# Patient Record
Sex: FEMALE | Race: BLACK OR AFRICAN AMERICAN | Employment: FULL TIME | URBAN - METROPOLITAN AREA
[De-identification: names, ages, dates, MRNs, and addresses within clinical notes are randomized per-mention and may not be internally consistent; named-entity substitution may affect disease eponyms.]

---

## 2024-11-05 PROBLEM — R87.610 ASCUS WITH POSITIVE HIGH RISK HPV CERVICAL: Status: ACTIVE | Noted: 2021-12-15

## 2024-11-05 PROBLEM — J45.909 ASTHMA: Status: RESOLVED | Noted: 2023-02-22 | Resolved: 2024-11-05

## 2024-11-05 PROBLEM — I10 HYPERTENSION: Status: RESOLVED | Noted: 2023-02-22 | Resolved: 2024-11-05

## 2024-11-05 PROBLEM — R87.810 ASCUS WITH POSITIVE HIGH RISK HPV CERVICAL: Status: ACTIVE | Noted: 2021-12-15

## 2024-11-05 PROBLEM — J45.20 MILD INTERMITTENT ASTHMA WITHOUT COMPLICATION: Status: ACTIVE | Noted: 2018-05-03

## 2024-11-05 PROBLEM — Z90.710 HX OF TOTAL HYSTERECTOMY: Status: ACTIVE | Noted: 2024-05-13

## 2024-11-05 PROBLEM — R20.0 LEFT SIDED NUMBNESS: Status: RESOLVED | Noted: 2023-02-17 | Resolved: 2024-11-05

## 2024-11-22 ENCOUNTER — HOSPITAL ENCOUNTER (OUTPATIENT)
Dept: WOMENS IMAGING | Age: 54
Discharge: HOME | End: 2024-11-22
Attending: INTERNAL MEDICINE

## 2024-11-22 DIAGNOSIS — R92.8 ABNORMAL MAMMOGRAM: ICD-10-CM

## 2025-01-21 ENCOUNTER — OFFICE VISIT (OUTPATIENT)
Dept: SLEEP MEDICINE | Age: 55
End: 2025-01-21
Payer: COMMERCIAL

## 2025-01-21 VITALS
SYSTOLIC BLOOD PRESSURE: 125 MMHG | HEIGHT: 67 IN | OXYGEN SATURATION: 98 % | TEMPERATURE: 96.9 F | DIASTOLIC BLOOD PRESSURE: 70 MMHG | BODY MASS INDEX: 45.17 KG/M2 | HEART RATE: 95 BPM | RESPIRATION RATE: 18 BRPM | WEIGHT: 287.8 LBS

## 2025-01-21 DIAGNOSIS — R06.89 GASPING FOR BREATH: ICD-10-CM

## 2025-01-21 DIAGNOSIS — I10 BENIGN ESSENTIAL HTN: ICD-10-CM

## 2025-01-21 DIAGNOSIS — G47.33 OSA (OBSTRUCTIVE SLEEP APNEA): Primary | ICD-10-CM

## 2025-01-21 DIAGNOSIS — R06.83 SNORING: ICD-10-CM

## 2025-01-21 DIAGNOSIS — G47.19 EXCESSIVE DAYTIME SLEEPINESS: ICD-10-CM

## 2025-01-21 PROCEDURE — 3074F SYST BP LT 130 MM HG: CPT | Performed by: PSYCHIATRY & NEUROLOGY

## 2025-01-21 PROCEDURE — 99204 OFFICE O/P NEW MOD 45 MIN: CPT | Performed by: PSYCHIATRY & NEUROLOGY

## 2025-01-21 PROCEDURE — 3078F DIAST BP <80 MM HG: CPT | Performed by: PSYCHIATRY & NEUROLOGY

## 2025-01-21 ASSESSMENT — ENCOUNTER SYMPTOMS
RESPIRATORY NEGATIVE: 1
GASTROINTESTINAL NEGATIVE: 1
EYES NEGATIVE: 1
ALLERGIC/IMMUNOLOGIC NEGATIVE: 1
SORE THROAT: 1

## 2025-01-21 ASSESSMENT — SLEEP AND FATIGUE QUESTIONNAIRES
HOW LIKELY ARE YOU TO NOD OFF OR FALL ASLEEP IN A CAR, WHILE STOPPED FOR A FEW MINUTES IN TRAFFIC: WOULD NEVER DOZE
HOW LIKELY ARE YOU TO NOD OFF OR FALL ASLEEP WHILE SITTING AND TALKING TO SOMEONE: WOULD NEVER DOZE
HOW LIKELY ARE YOU TO NOD OFF OR FALL ASLEEP WHILE SITTING QUIETLY AFTER LUNCH WITHOUT ALCOHOL: SLIGHT CHANCE OF DOZING
ESS TOTAL SCORE: 6
HOW LIKELY ARE YOU TO NOD OFF OR FALL ASLEEP WHEN YOU ARE A PASSENGER IN A CAR FOR AN HOUR WITHOUT A BREAK: SLIGHT CHANCE OF DOZING
HOW LIKELY ARE YOU TO NOD OFF OR FALL ASLEEP WHILE SITTING INACTIVE IN A PUBLIC PLACE: WOULD NEVER DOZE
HOW LIKELY ARE YOU TO NOD OFF OR FALL ASLEEP WHILE LYING DOWN TO REST IN THE AFTERNOON WHEN CIRCUMSTANCES PERMIT: MODERATE CHANCE OF DOZING
HOW LIKELY ARE YOU TO NOD OFF OR FALL ASLEEP WHILE WATCHING TV: SLIGHT CHANCE OF DOZING
HOW LIKELY ARE YOU TO NOD OFF OR FALL ASLEEP WHILE SITTING AND READING: SLIGHT CHANCE OF DOZING

## 2025-01-21 NOTE — PATIENT INSTRUCTIONS
Orders Placed This Encounter   Procedures    Home Sleep Study     Standing Status:   Future     Standing Expiration Date:   1/21/2026     Order Specific Question:   Location For Sleep Study     Answer:   [place]     Order Specific Question:   Select Sleep Lab Location     Answer:   [facility]

## 2025-01-21 NOTE — PROGRESS NOTES
MD JENNIFER Chacon Board Certified in Sleep Medicine  [facility]ified [facility]  Board Certified in [facility]  [address]   [address]  [place]  [address]  P- [phone]   [facility]   [address]  [address]   [place], [place] [address]           [facility]  [address] [address] [phone]    Subjective:     Patient ID: Milli Ruiz is a 54 y.o. female.    Chief Complaint   Patient presents with    [facility] Care    Snoring       HPI:        Milli Ruiz is a 54 y.o. female referred by Dr. Martinez for a sleep evaluation. She complains of snoring, snorting, choking, tossing and turning, excessive daytime sleepiness, feels sleepy during the day, take naps during the day but she denies periods of not breathing, knees buckling with laughing, completely or partially paralyzed while falling asleep or waking up.  Symptoms began several years ago, gradually worsening since that time.   The patient's bed-partner confirmed the snoring without stopped breathing at night  SLEEP SCHEDULE: Goes to bed around 11:30 PM in the weekdays and 11:30 PM in the weekends. It usually takes the patient 10 minutes to fall asleep. The patient gets up 1 per night to go to the bathroom. The Patient finally gets up at 7 AM during the weekdays and 7 AM in the weekends. patient wakes up with dry mouth.  The patient has restless sleep with frequent arousals in addition to the Patient has significant daytime sleepiness. The Patient scored Erin Sleepiness Score: 6 on Erin Sleepiness Scale ( more than 10 is indicative of daytime sleepiness)and 34 in fatigue scale ( more than 36 is indicative of daytime fatigue). The patient takes 1-2 naps/week for 120 minutes and usually is not refreshing nap.     Previous evaluation and treatment has included-

## 2025-01-28 PROBLEM — D58.2 HIGH BLOOD HEMOGLOBIN F: Status: ACTIVE | Noted: 2025-01-28

## 2025-01-28 PROBLEM — R73.03 PREDIABETES: Status: ACTIVE | Noted: 2025-01-28

## 2025-01-28 PROBLEM — R70.0 ELEVATED SED RATE: Status: ACTIVE | Noted: 2025-01-28

## 2025-01-28 PROBLEM — D63.8 ANEMIA OF CHRONIC DISEASE: Status: ACTIVE | Noted: 2025-01-28

## 2025-02-10 PROBLEM — N18.31 STAGE 3A CHRONIC KIDNEY DISEASE (HCC): Status: ACTIVE | Noted: 2025-02-10

## 2025-04-01 NOTE — PROGRESS NOTES
Milli Ruiz (:  1970) is a 54 y.o. female,, here for evaluation of the following chief complaint(s):  New Patient (Bp concerns feeling light headed)    PMHx: osteoarthritis, mild intermittent asthma, morbid obesity, HLD, HTN, PTSD, chronic allergic rhinitis, hx of abnormal pap smear, elevated Hg F, anemia of chronic dx,CHARLES, CKD, prediabetes,    Microcytic anemaia- Elevated HgF in labs done on 2025 by her prior PCP , was advised to follow up with hem onc.  Started to see hematologist at [facility].    Mammogram 3/28/2025- 9 millimeter mass identified in the 5-6:00 region of the patient's left breast       Works at [employer].    Paternal grandmother with breast cancer, paternal uncle and aunt caner- unknown   Stroke - paternal grandfather    - lives with  and has step kids.  Assessment & Plan  Benign essential HTN    Chronic  Her blood pressure has been in the lower side, recent result-101/58 and patient is symptomatic.  Reduce the dose of her blood pressure medication  Continue with verapamil 180 daily, stop hydrochlorothiazide.  Continue with Aldactone and lisinopril.  Counseled on low-salt diet.       Mild intermittent asthma without complication   Chronic, at goal (stable), continue current treatment plan         Mixed hyperlipidemia   Chronic, at goal (stable), continue current treatment plan  Tolerating Lipitor 40 mg daily.       Prediabetes   Chronic, at goal (stable), lifestyle modifications recommended         Abnormal mammogram    Mammogram done yesterday showed a 9 mm mass.  Follows her GYN.  Has scheduled for ultrasound and diagnostic mammogram.  Will wait for results.       Stage 3a chronic kidney disease (HCC)   Chronic, at goal (stable), avoid nephrotoxic's agents and, increase hydration and lifestyle modifications recommended         Light-headed    In the setting of hypotension  Patient's medication adjustment made.         Return in about 2 weeks (around 2025)

## 2025-04-02 ENCOUNTER — OFFICE VISIT (OUTPATIENT)
Dept: INTERNAL MEDICINE CLINIC | Age: 55
End: 2025-04-02
Payer: COMMERCIAL

## 2025-04-02 VITALS
BODY MASS INDEX: 45.36 KG/M2 | DIASTOLIC BLOOD PRESSURE: 70 MMHG | SYSTOLIC BLOOD PRESSURE: 122 MMHG | HEART RATE: 92 BPM | WEIGHT: 289 LBS | OXYGEN SATURATION: 96 % | HEIGHT: 67 IN

## 2025-04-02 DIAGNOSIS — R92.8 ABNORMAL MAMMOGRAM: ICD-10-CM

## 2025-04-02 DIAGNOSIS — R73.03 PREDIABETES: ICD-10-CM

## 2025-04-02 DIAGNOSIS — N18.31 STAGE 3A CHRONIC KIDNEY DISEASE (HCC): ICD-10-CM

## 2025-04-02 DIAGNOSIS — J45.20 MILD INTERMITTENT ASTHMA WITHOUT COMPLICATION: ICD-10-CM

## 2025-04-02 DIAGNOSIS — I10 BENIGN ESSENTIAL HTN: Primary | ICD-10-CM

## 2025-04-02 DIAGNOSIS — E78.2 MIXED HYPERLIPIDEMIA: ICD-10-CM

## 2025-04-02 DIAGNOSIS — R42 LIGHT-HEADED: ICD-10-CM

## 2025-04-02 PROCEDURE — 99215 OFFICE O/P EST HI 40 MIN: CPT | Performed by: STUDENT IN AN ORGANIZED HEALTH CARE EDUCATION/TRAINING PROGRAM

## 2025-04-02 PROCEDURE — 3074F SYST BP LT 130 MM HG: CPT | Performed by: STUDENT IN AN ORGANIZED HEALTH CARE EDUCATION/TRAINING PROGRAM

## 2025-04-02 PROCEDURE — G2211 COMPLEX E/M VISIT ADD ON: HCPCS | Performed by: STUDENT IN AN ORGANIZED HEALTH CARE EDUCATION/TRAINING PROGRAM

## 2025-04-02 PROCEDURE — 3078F DIAST BP <80 MM HG: CPT | Performed by: STUDENT IN AN ORGANIZED HEALTH CARE EDUCATION/TRAINING PROGRAM

## 2025-04-02 RX ORDER — VERAPAMIL HYDROCHLORIDE 180 MG/1
180 TABLET, FILM COATED, EXTENDED RELEASE ORAL NIGHTLY
Qty: 180 TABLET | Refills: 0
Start: 2025-04-02

## 2025-04-02 SDOH — HEALTH STABILITY: PHYSICAL HEALTH: ON AVERAGE, HOW MANY DAYS PER WEEK DO YOU ENGAGE IN MODERATE TO STRENUOUS EXERCISE (LIKE A BRISK WALK)?: 5 DAYS

## 2025-04-02 SDOH — HEALTH STABILITY: PHYSICAL HEALTH: ON AVERAGE, HOW MANY MINUTES DO YOU ENGAGE IN EXERCISE AT THIS LEVEL?: 20 MIN

## 2025-04-02 ASSESSMENT — ENCOUNTER SYMPTOMS
SHORTNESS OF BREATH: 0
GASTROINTESTINAL NEGATIVE: 1
EYE DISCHARGE: 0
ALLERGIC/IMMUNOLOGIC NEGATIVE: 1
EYES NEGATIVE: 1
ABDOMINAL PAIN: 0
RESPIRATORY NEGATIVE: 1

## 2025-04-02 NOTE — ASSESSMENT & PLAN NOTE
Chronic, at goal (stable), avoid nephrotoxic's agents and, increase hydration and lifestyle modifications recommended

## 2025-04-02 NOTE — ASSESSMENT & PLAN NOTE
Chronic  Her blood pressure has been in the lower side, recent result-101/58 and patient is symptomatic.  Reduce the dose of her blood pressure medication  Continue with verapamil 180 daily, stop hydrochlorothiazide.  Continue with Aldactone and lisinopril.  Counseled on low-salt diet.

## 2025-04-16 ASSESSMENT — ENCOUNTER SYMPTOMS
EYES NEGATIVE: 1
ABDOMINAL PAIN: 0
EYE DISCHARGE: 0
SHORTNESS OF BREATH: 0
GASTROINTESTINAL NEGATIVE: 1
RESPIRATORY NEGATIVE: 1
ALLERGIC/IMMUNOLOGIC NEGATIVE: 1

## 2025-04-16 NOTE — PROGRESS NOTES
Milli Ruiz (:  1970) is a 54 y.o. female,, here for evaluation of the following chief complaint(s):  Hypertension    PMHx: osteoarthritis, mild intermittent asthma, morbid obesity, HLD, HTN, PTSD, chronic allergic rhinitis, hx of abnormal pap smear, elevated Hg F, anemia of chronic dx,CHARLES, CKD, prediabetes,    Microcytic anemaia- Elevated HgF in labs done on 2025 by her prior PCP , was advised to follow up with hem onc.  Started to see hematologist at [facility].    Mammogram 3/28/2025- 9 millimeter mass identified in the 5-6:00 region of the patient's left breast  Mammogram and usg- 2025- benign findings- f/u in 1 year       Works at [employer].    Paternal grandmother with breast cancer, paternal uncle and aunt caner- unknown   Stroke - paternal grandfather    - lives with  and has step kids.  Assessment & Plan  Benign essential HTN   Chronic, at goal (stable), improvement on her blood pressure.  No longer reports of symptoms, declines dizziness.  Continue with current dose of lisinopril 40 mg, Aldactone 25 mg.  Lowered the dose of verapamil to 120 mg daily.  Encouraged on low-salt diet.  Orders:    verapamil (CALAN SR) 120 MG extended release tablet; Take 1 tablet by mouth nightly    Seasonal allergies    Chronic  Symptoms controlled on medicine.  Continue with Singulair, nasal spray.            Return in about 3 months (around 2025).       Subjective   HPI    Review of Systems   Constitutional: Negative.  Negative for chills and fever.   HENT: Negative.     Eyes: Negative.  Negative for discharge.   Respiratory: Negative.  Negative for shortness of breath.    Cardiovascular: Negative.  Negative for chest pain and palpitations.   Gastrointestinal: Negative.  Negative for abdominal pain.   Endocrine: Negative.    Genitourinary: Negative.    Musculoskeletal: Negative.    Skin: Negative.    Allergic/Immunologic: Negative.    Neurological: Negative.  Negative for dizziness

## 2025-04-17 ENCOUNTER — OFFICE VISIT (OUTPATIENT)
Dept: INTERNAL MEDICINE CLINIC | Age: 55
End: 2025-04-17

## 2025-04-17 VITALS
SYSTOLIC BLOOD PRESSURE: 108 MMHG | HEART RATE: 91 BPM | BODY MASS INDEX: 45.73 KG/M2 | DIASTOLIC BLOOD PRESSURE: 80 MMHG | OXYGEN SATURATION: 99 % | WEIGHT: 292 LBS

## 2025-04-17 DIAGNOSIS — J30.2 SEASONAL ALLERGIES: ICD-10-CM

## 2025-04-17 DIAGNOSIS — I10 BENIGN ESSENTIAL HTN: Primary | ICD-10-CM

## 2025-04-17 RX ORDER — VERAPAMIL HYDROCHLORIDE 120 MG/1
120 TABLET, FILM COATED, EXTENDED RELEASE ORAL NIGHTLY
Qty: 90 TABLET | Refills: 0 | Status: SHIPPED | OUTPATIENT
Start: 2025-04-17

## 2025-04-17 NOTE — ASSESSMENT & PLAN NOTE
Chronic, at goal (stable), improvement on her blood pressure.  No longer reports of symptoms, declines dizziness.  Continue with current dose of lisinopril 40 mg, Aldactone 25 mg.  Lowered the dose of verapamil to 120 mg daily.  Encouraged on low-salt diet.  Orders:    verapamil (CALAN SR) 120 MG extended release tablet; Take 1 tablet by mouth nightly

## 2025-05-28 ENCOUNTER — TELEPHONE (OUTPATIENT)
Dept: INTERNAL MEDICINE CLINIC | Age: 55
End: 2025-05-28

## 2025-05-28 DIAGNOSIS — J30.2 SEASONAL ALLERGIES: Primary | ICD-10-CM

## 2025-05-28 DIAGNOSIS — J45.20 MILD INTERMITTENT ASTHMA WITHOUT COMPLICATION: ICD-10-CM

## 2025-05-28 NOTE — TELEPHONE ENCOUNTER
Pt is requesting a referral for an allergy specialist    She is having issues with dust mites in her office at work. OTC stuff is not working    Please advise    Thank you

## 2025-06-18 ENCOUNTER — OFFICE VISIT (OUTPATIENT)
Dept: INTERNAL MEDICINE CLINIC | Age: 55
End: 2025-06-18
Payer: COMMERCIAL

## 2025-06-18 VITALS
SYSTOLIC BLOOD PRESSURE: 138 MMHG | DIASTOLIC BLOOD PRESSURE: 60 MMHG | BODY MASS INDEX: 46.67 KG/M2 | WEIGHT: 293 LBS | OXYGEN SATURATION: 99 % | HEART RATE: 61 BPM

## 2025-06-18 DIAGNOSIS — N18.31 STAGE 3A CHRONIC KIDNEY DISEASE (HCC): ICD-10-CM

## 2025-06-18 DIAGNOSIS — J01.00 ACUTE NON-RECURRENT MAXILLARY SINUSITIS: Primary | ICD-10-CM

## 2025-06-18 DIAGNOSIS — D58.2 HIGH BLOOD HEMOGLOBIN F: ICD-10-CM

## 2025-06-18 DIAGNOSIS — L29.9 GENERALIZED PRURITUS: ICD-10-CM

## 2025-06-18 PROCEDURE — 99214 OFFICE O/P EST MOD 30 MIN: CPT | Performed by: STUDENT IN AN ORGANIZED HEALTH CARE EDUCATION/TRAINING PROGRAM

## 2025-06-18 PROCEDURE — G2211 COMPLEX E/M VISIT ADD ON: HCPCS | Performed by: STUDENT IN AN ORGANIZED HEALTH CARE EDUCATION/TRAINING PROGRAM

## 2025-06-18 PROCEDURE — 3075F SYST BP GE 130 - 139MM HG: CPT | Performed by: STUDENT IN AN ORGANIZED HEALTH CARE EDUCATION/TRAINING PROGRAM

## 2025-06-18 PROCEDURE — 3078F DIAST BP <80 MM HG: CPT | Performed by: STUDENT IN AN ORGANIZED HEALTH CARE EDUCATION/TRAINING PROGRAM

## 2025-06-18 RX ORDER — AZITHROMYCIN 250 MG/1
TABLET, FILM COATED ORAL
Qty: 6 TABLET | Refills: 0 | Status: SHIPPED | OUTPATIENT
Start: 2025-06-18 | End: 2025-06-28

## 2025-06-18 RX ORDER — HYDROXYZINE HYDROCHLORIDE 25 MG/1
25 TABLET, FILM COATED ORAL EVERY 8 HOURS PRN
Qty: 30 TABLET | Refills: 0 | Status: SHIPPED | OUTPATIENT
Start: 2025-06-18 | End: 2025-06-28

## 2025-06-18 ASSESSMENT — ENCOUNTER SYMPTOMS
GASTROINTESTINAL NEGATIVE: 1
SINUS PRESSURE: 1
EYES NEGATIVE: 1
EYE DISCHARGE: 0
ALLERGIC/IMMUNOLOGIC NEGATIVE: 1
SORE THROAT: 1
SHORTNESS OF BREATH: 0
RESPIRATORY NEGATIVE: 1
ABDOMINAL PAIN: 0
SINUS COMPLAINT: 1

## 2025-06-18 NOTE — PROGRESS NOTES
Milli Ruiz (:  1970) is a 54 y.o. female,, here for evaluation of the following chief complaint(s):  Sinus Problem (Itching and tingle allergy test done 25 nothing found)    PMHx: osteoarthritis, mild intermittent asthma, morbid obesity, HLD, HTN, PTSD, chronic allergic rhinitis, hx of abnormal pap smear, elevated Hg F, anemia of chronic dx,CHARLES, CKD, prediabetes,    Microcytic anemaia- Elevated HgF in labs done on 2025 by her prior PCP , was advised to follow up with hem onc.  Started to see hematologist at [facility].    Mammogram 3/28/2025- 9 millimeter mass identified in the 5-6:00 region of the patient's left breast  Mammogram and usg- 2025- benign findings- f/u in 1 year       Works at [employer].    Paternal grandmother with breast cancer, paternal uncle and aunt caner- unknown   Stroke - paternal grandfather    - lives with  and has step kids.      Was soon by the allergy specialist and test negative.  Assessment & Plan  Acute non-recurrent maxillary sinusitis   Acute condition, new, resolved after Allegra to get her allergy skin test.  Start on azithromycin.  Restart using Allegra.  Continue with his nasal spray.  Orders:    azithromycin (ZITHROMAX) 250 MG tablet; 500mg on day 1 followed by 250mg on days 2 - 5    Generalized pruritus  Worsening,  Was seen by allergist, labs showed normal liver function and kidney function.  Known history of hpf, following with oncologist.  Advised to follow-up with oncologist for further evaluation.  Use Atarax as needed.  Continue using topical Benadryl.  Orders:    hydrOXYzine HCl (ATARAX) 25 MG tablet; Take 1 tablet by mouth every 8 hours as needed for Itching    High blood hemoglobin F   Monitored by specialist- no acute findings meriting change in the plan         Stage 3a chronic kidney disease (HCC)   Chronic  Kidney function has improved.  Continue to avoid nephrotoxic agent.  Increase hydration.            Return if

## 2025-06-18 NOTE — ASSESSMENT & PLAN NOTE
Chronic  Kidney function has improved.  Continue to avoid nephrotoxic agent.  Increase hydration.

## 2025-06-30 ENCOUNTER — OFFICE VISIT (OUTPATIENT)
Dept: INTERNAL MEDICINE CLINIC | Age: 55
End: 2025-06-30
Payer: COMMERCIAL

## 2025-06-30 VITALS
SYSTOLIC BLOOD PRESSURE: 114 MMHG | OXYGEN SATURATION: 95 % | TEMPERATURE: 98 F | DIASTOLIC BLOOD PRESSURE: 80 MMHG | BODY MASS INDEX: 46.86 KG/M2 | WEIGHT: 293 LBS | HEART RATE: 102 BPM

## 2025-06-30 DIAGNOSIS — J01.00 ACUTE NON-RECURRENT MAXILLARY SINUSITIS: Primary | ICD-10-CM

## 2025-06-30 PROCEDURE — 3074F SYST BP LT 130 MM HG: CPT | Performed by: STUDENT IN AN ORGANIZED HEALTH CARE EDUCATION/TRAINING PROGRAM

## 2025-06-30 PROCEDURE — 3079F DIAST BP 80-89 MM HG: CPT | Performed by: STUDENT IN AN ORGANIZED HEALTH CARE EDUCATION/TRAINING PROGRAM

## 2025-06-30 PROCEDURE — 99213 OFFICE O/P EST LOW 20 MIN: CPT | Performed by: STUDENT IN AN ORGANIZED HEALTH CARE EDUCATION/TRAINING PROGRAM

## 2025-06-30 RX ORDER — PREDNISONE 20 MG/1
TABLET ORAL
Qty: 10 TABLET | Refills: 0 | Status: SHIPPED | OUTPATIENT
Start: 2025-06-30 | End: 2025-07-08

## 2025-06-30 RX ORDER — BUDESONIDE 0.5 MG/2ML
500 INHALANT ORAL 2 TIMES DAILY
Qty: 60 EACH | Refills: 3 | Status: SHIPPED | OUTPATIENT
Start: 2025-06-30

## 2025-06-30 NOTE — PROGRESS NOTES
Milli Ruiz (:  1970) is a 54 y.o. female,Established patient, here for evaluation of the following chief complaint(s): Sinusitis (Seen 25; sinus; given abx; now having same sx's)    Milli is a 54 year old female with past medical history as noted below.    Active Ambulatory Problems     Diagnosis Date Noted    Hyperlipidemia 2023    BMI 40.0-44.9, adult (HCC) 2024    Allergic rhinitis 2014    ASCUS with positive high risk HPV cervical 12/15/2021    Benign essential HTN 2006    Hx of total hysterectomy 2024    Mild intermittent asthma without complication 2018    High blood hemoglobin F 2025    Anemia of chronic disease 2025    Prediabetes 2025    Elevated sed rate 2025    Stage 3a chronic kidney disease (HCC) 02/10/2025     Resolved Ambulatory Problems     Diagnosis Date Noted    Left sided numbness 2023    Asthma 2023    Hypertension 2023    Other iron deficiency anemias 2005     Past Medical History:   Diagnosis Date    Arthritis     Neck pain     PTSD (post-traumatic stress disorder)     Short-term memory loss      She was seen in the office on - for acute sinusitis. She was treated with azithromycin and encouraged to continue Allegra and Flonase. She reports symptoms have not improved. Does report some mild relief for maybe four days. She continues to have symptoms of congestion, BL ear pressure, eye watering, and rhinorrhea. Denies fevers, chills, nausea, vomiting, or diarrhea. Does report ABX use in the last 90 days.     She has been taking azelastine, navage, Flonase and allegra. She has been on allegra for about two months.     Vitals:    25 1356   BP: 114/80   Pulse: (!) 102   Temp: 98 °F (36.7 °C)   SpO2: 95%        Assessment & Plan  Acute non-recurrent maxillary sinusitis   Acute condition, new, Educational material distributed and questions answered.  - START Augmentin and prednisone

## 2025-07-08 ASSESSMENT — ENCOUNTER SYMPTOMS
FACIAL SWELLING: 0
TROUBLE SWALLOWING: 0
RHINORRHEA: 1
SINUS PAIN: 1
SORE THROAT: 0
SINUS PRESSURE: 1
NAUSEA: 0
VOMITING: 0
VOICE CHANGE: 0
DIARRHEA: 0

## 2025-07-29 DIAGNOSIS — I10 BENIGN ESSENTIAL HTN: ICD-10-CM

## 2025-07-29 RX ORDER — VERAPAMIL HYDROCHLORIDE 120 MG/1
TABLET, FILM COATED, EXTENDED RELEASE ORAL
Qty: 90 TABLET | Refills: 0 | Status: SHIPPED | OUTPATIENT
Start: 2025-07-29

## 2025-07-29 NOTE — TELEPHONE ENCOUNTER
Future Appointments   Date Time Provider Department Center   8/8/2025  9:30 AM León Mendoza MD [facility] DEP       Last appt 6/30/25

## 2025-08-07 ASSESSMENT — ENCOUNTER SYMPTOMS
EYES NEGATIVE: 1
SHORTNESS OF BREATH: 0
GASTROINTESTINAL NEGATIVE: 1
ALLERGIC/IMMUNOLOGIC NEGATIVE: 1
ABDOMINAL PAIN: 0
EYE DISCHARGE: 0
RESPIRATORY NEGATIVE: 1

## 2025-08-08 ENCOUNTER — OFFICE VISIT (OUTPATIENT)
Dept: INTERNAL MEDICINE CLINIC | Age: 55
End: 2025-08-08
Payer: COMMERCIAL

## 2025-08-08 VITALS
SYSTOLIC BLOOD PRESSURE: 128 MMHG | HEART RATE: 88 BPM | BODY MASS INDEX: 46.99 KG/M2 | OXYGEN SATURATION: 98 % | DIASTOLIC BLOOD PRESSURE: 82 MMHG | WEIGHT: 293 LBS

## 2025-08-08 DIAGNOSIS — E78.2 MIXED HYPERLIPIDEMIA: ICD-10-CM

## 2025-08-08 DIAGNOSIS — J30.89 ALLERGY TO DUST: ICD-10-CM

## 2025-08-08 DIAGNOSIS — R73.03 PREDIABETES: ICD-10-CM

## 2025-08-08 DIAGNOSIS — I10 PRIMARY HYPERTENSION: Primary | ICD-10-CM

## 2025-08-08 DIAGNOSIS — N18.31 STAGE 3A CHRONIC KIDNEY DISEASE (HCC): ICD-10-CM

## 2025-08-08 LAB
ANION GAP SERPL CALCULATED.3IONS-SCNC: 14 MMOL/L (ref 3–16)
BUN SERPL-MCNC: 16 MG/DL (ref 7–20)
CALCIUM SERPL-MCNC: 9.7 MG/DL (ref 8.3–10.6)
CHLORIDE SERPL-SCNC: 108 MMOL/L (ref 99–110)
CO2 SERPL-SCNC: 20 MMOL/L (ref 21–32)
CREAT SERPL-MCNC: 1 MG/DL (ref 0.6–1.1)
GFR SERPLBLD CREATININE-BSD FMLA CKD-EPI: 67 ML/MIN/{1.73_M2}
GLUCOSE SERPL-MCNC: 98 MG/DL (ref 70–99)
POTASSIUM SERPL-SCNC: 4 MMOL/L (ref 3.5–5.1)
SODIUM SERPL-SCNC: 142 MMOL/L (ref 136–145)

## 2025-08-08 PROCEDURE — G2211 COMPLEX E/M VISIT ADD ON: HCPCS | Performed by: STUDENT IN AN ORGANIZED HEALTH CARE EDUCATION/TRAINING PROGRAM

## 2025-08-08 PROCEDURE — 3079F DIAST BP 80-89 MM HG: CPT | Performed by: STUDENT IN AN ORGANIZED HEALTH CARE EDUCATION/TRAINING PROGRAM

## 2025-08-08 PROCEDURE — 99214 OFFICE O/P EST MOD 30 MIN: CPT | Performed by: STUDENT IN AN ORGANIZED HEALTH CARE EDUCATION/TRAINING PROGRAM

## 2025-08-08 PROCEDURE — 3074F SYST BP LT 130 MM HG: CPT | Performed by: STUDENT IN AN ORGANIZED HEALTH CARE EDUCATION/TRAINING PROGRAM

## 2025-08-08 RX ORDER — MONTELUKAST SODIUM 10 MG/1
10 TABLET ORAL NIGHTLY
Qty: 90 TABLET | Refills: 0 | Status: SHIPPED | OUTPATIENT
Start: 2025-08-08

## 2025-08-08 RX ORDER — HYDROXYZINE HYDROCHLORIDE 25 MG/1
25 TABLET, FILM COATED ORAL 3 TIMES DAILY PRN
COMMUNITY
End: 2025-08-08 | Stop reason: SDUPTHER

## 2025-08-08 RX ORDER — HYDROXYZINE HYDROCHLORIDE 25 MG/1
25 TABLET, FILM COATED ORAL 3 TIMES DAILY PRN
Qty: 30 TABLET | Refills: 0 | Status: SHIPPED | OUTPATIENT
Start: 2025-08-08

## 2025-08-09 LAB
EST. AVERAGE GLUCOSE BLD GHB EST-MCNC: 137 MG/DL
HBA1C MFR BLD: 6.4 %